# Patient Record
Sex: MALE | Race: OTHER
[De-identification: names, ages, dates, MRNs, and addresses within clinical notes are randomized per-mention and may not be internally consistent; named-entity substitution may affect disease eponyms.]

---

## 2020-05-27 ENCOUNTER — HOSPITAL ENCOUNTER (EMERGENCY)
Dept: HOSPITAL 26 - MED | Age: 25
Discharge: HOME | End: 2020-05-27
Payer: SELF-PAY

## 2020-05-27 VITALS — BODY MASS INDEX: 39.9 KG/M2 | HEIGHT: 66 IN | WEIGHT: 248.25 LBS

## 2020-05-27 VITALS — SYSTOLIC BLOOD PRESSURE: 140 MMHG | DIASTOLIC BLOOD PRESSURE: 86 MMHG

## 2020-05-27 VITALS — DIASTOLIC BLOOD PRESSURE: 94 MMHG | SYSTOLIC BLOOD PRESSURE: 145 MMHG

## 2020-05-27 DIAGNOSIS — K52.9: Primary | ICD-10-CM

## 2020-05-27 NOTE — NUR
C/O NAUSEA WITH INTERMITTENT EPIGASTRIC PAIN X 3 DAYS. 7/10 IN SEVERITY.  

PT DENIES PAIN WORSENING WITH EATING. STATES WORSE WHEN LYING FLAT. 

DENIES UTI S/S. DENIES VOMITING OR DIARRHEA.

LAST BM NORMAL TODAY. DRANK WHISKEY 3 DAYS AGO.

EPIGASTRIC REGION IS TENDER TO TOUCH, BOWEL SOUNDS NORMAL.

PT ALERT AND AWAKE, AMBULATORY

MED HX: DENIES

## 2020-05-27 NOTE — NUR
Patient discharged with v/s stable. Written and verbal after care instructions 
given and explained REGARDING GASTRITIS. 

Patient alert, oriented and verbalized understanding of instructions. 
Ambulatory with steady gait. All questions addressed prior to discharge. ID 
band removed. Patient advised to follow up with PMD. Rx of PROTONIX given. 
Patient educated on indication of medication including possible reaction and 
side effects. Opportunity to ask questions provided and answered.

PT GIVEN LIST OF FOODS TO AVOID- INCLUDING ALCOHOL, SPICY FOODS, EXCESIVE 
AMOUNTS OF COFFEE, ETC